# Patient Record
Sex: FEMALE | Race: WHITE | Employment: OTHER | URBAN - METROPOLITAN AREA
[De-identification: names, ages, dates, MRNs, and addresses within clinical notes are randomized per-mention and may not be internally consistent; named-entity substitution may affect disease eponyms.]

---

## 2017-01-03 ENCOUNTER — OFFICE VISIT (OUTPATIENT)
Dept: CARDIOLOGY CLINIC | Age: 82
End: 2017-01-03

## 2017-01-03 VITALS
HEIGHT: 65 IN | WEIGHT: 177.6 LBS | HEART RATE: 60 BPM | DIASTOLIC BLOOD PRESSURE: 80 MMHG | RESPIRATION RATE: 16 BRPM | BODY MASS INDEX: 29.59 KG/M2 | SYSTOLIC BLOOD PRESSURE: 120 MMHG

## 2017-01-03 DIAGNOSIS — R60.0 BILATERAL LOWER EXTREMITY EDEMA: ICD-10-CM

## 2017-01-03 DIAGNOSIS — I10 BENIGN ESSENTIAL HTN: Primary | ICD-10-CM

## 2017-01-03 DIAGNOSIS — E78.2 MIXED HYPERLIPIDEMIA: ICD-10-CM

## 2017-01-03 RX ORDER — DIPHENHYDRAMINE HCL 25 MG
25 CAPSULE ORAL
COMMUNITY

## 2017-01-03 RX ORDER — GUAIFENESIN 100 MG/5ML
200 SOLUTION ORAL
COMMUNITY
End: 2017-07-27 | Stop reason: ALTCHOICE

## 2017-01-03 RX ORDER — FUROSEMIDE 40 MG/1
TABLET ORAL DAILY
COMMUNITY

## 2017-01-03 NOTE — PROGRESS NOTES
[facility]: Doc Denis  ([phone]) [phone]    HPI: Ms. 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 Since her last visit, she has been doing okay. No chest pain or shortness of breath. No palpitations. She has occasional times when she may get some dizziness if she stands up quickly. She is seeing her eye specialist and noted to have macular degeneration. She is compensated on exam with clear lungs and 1+ chronic bilateral lower extremity edema, unchanged. Assessment and Plan:   1. Essential hypertension. Blood pressure is controlled and no changes made. She has a history of white coat hypertension. 2. Mixed hyperlipidemia. Tolerating statin. 3. Venous insufficiency. Elevate legs, compression stockings, avoid salt. She  has a past medical history of Arrhythmia; Cancer ([address] Descending thoracic aortic dissection ([address] Hypertension; Osteoarthritis; and Other ill-defined conditions(799.89). All other 12 pt systems negative except as above. PE  Vitals:    01/03/17 1408   BP: 120/80   Pulse: 60   Resp: 16   Weight: 177 lb 9.6 oz (80.6 kg)   Height: 5' 5\" (1.651 m)      Body mass index is 29.55 kg/(m^2).    General appearance - alert,and in no distress   Mental status - affect appropriate to mood   Neck - supple, no significant JVD   Chest - clear to auscultation, no wheezes, rales or rhonchi   Heart - normal rate, regular rhythm, normal S1, S2, 2/6 LSB murmurs, rubs, clicks or gallops   Abdomen - soft, nontender, nondistended   Extremities - peripheral pulses normal, 1+ pitting LE edema unchanged   Recent Labs:  Lab Results   Component Value Date/Time Cholesterol, total 171 01/22/2013 04:18 AM       Lab Results   Component Value Date/Time    Creatinine 0.88 01/22/2013 04:18 AM       Lab Results   Component Value Date/Time    BUN 21 01/22/2013 04:18 AM       Lab Results   Component Value Date/Time    Potassium 3.7 01/22/2013 04:18 AM       No results found for: HBA1C, [id number]    Lab Results   Component Value Date/Time    HGB 13.9 01/22/2013 04:18 AM       Lab Results   Component Value Date/Time    PLATELET 519 98/90/7050 04:18 AM         Reviewed:  Past Medical History   Diagnosis Date    Arrhythmia     Cancer ([facility] [address] [facility]      skin face / arms    Descending thoracic aortic dissection (HCC)      no surgical intervention    Hypertension     Osteoarthritis     Other ill-defined conditions(799.89)      macular degeneration. Left eye peripheral vision only, right eye vision impaired       History   Smoking Status    Never Smoker   Smokeless Tobacco    Never Used       History   Alcohol Use No       Allergies   Allergen Reactions    Celebrex [Celecoxib] Unknown (comments)    Darvocet A500 [Propoxyphene N-Acetaminophen] Unknown (comments)    Dilaudid [Hydromorphone (Bulk)] Unknown (comments)    Erythromycin Unknown (comments)    Lipitor [Atorvastatin] Unknown (comments)    Macrobid [Nitrofurantoin Monohyd/M-Cryst] Unknown (comments)    Penicillins Unknown (comments)    Sulfa (Sulfonamide Antibiotics) Unknown (comments)    Maxzide [Triamterene-Hydrochlorothiazid] Vertigo         Current Outpatient Prescriptions   Medication Sig    dextran 70-hypromellose (ARTIFICIAL TEARS,[id number],) ophthalmic solution Administer 1 Drop to both eyes four (4) times daily as needed.  diphenhydrAMINE (BENADRYL) 25 mg capsule Take 25 mg by mouth every six (6) hours as needed.  furosemide (LASIX) 40 mg tablet Take  by mouth daily.  guaiFENesin (ROBAFEN) 100 mg/5 mL liquid Take 200 mg by mouth three (3) times daily as needed for Cough.     pantoprazole (PROTONIX) 20 mg tablet TAKE ONE TABLET DAILY.  lisinopril (PRINIVIL, ZESTRIL) 40 mg tablet Take 1 Tab by mouth daily.  metoprolol tartrate (LOPRESSOR) 50 mg tablet TAKE ONE TABLET TWO TIMES A DAY    LANOLIN/MIN OIL/PETROLAT,WHT (ARTIFICIAL TEAR OINTMENT OP) Administer  to both eyes nightly.  pravastatin (PRAVACHOL) 20 mg tablet Take 2 Tabs by mouth nightly.  loperamide (IMODIUM) 2 mg capsule Take 2 mg by mouth every four (4) hours as needed for Diarrhea.  nitroglycerin (NITROSTAT) 0.4 mg SL tablet by SubLINGual route every five (5) minutes as needed.  acetaminophen (TYLENOL) 500 mg tablet Take  by mouth every six (6) hours as needed.  VIT C/DL-E AC/LUT/COPPER/ZNOX (PRESERVISION PO) Take  by mouth two (2) times a day.  aspirin 81 mg chewable tablet Take 81 mg by mouth daily. No current facility-administered medications for this visit.           Kathy Marinelli MD  [facility] [place]  [address] [facility]  [address]

## 2017-01-03 NOTE — MR AVS SNAPSHOT
Visit Information Date & Time Provider Department Dept. Phone Encounter #  
 1/3/2017  2:20 PM Indio Boyer MD [facility] [place] [phone] [id number] Your Appointments 7/5/2017  1:40 PM  
ESTABLISHED PATIENT with Indio Boyer MD  
[facility] ([place] [facility]) Appt Note: 6 mfu  
 [address] [place] [address]  
[address] [address] Upcoming Health Maintenance Date Due ZOSTER VACCINE AGE 60> 8/28/1979 GLAUCOMA SCREENING Q2Y 8/28/1984 OSTEOPOROSIS SCREENING (DEXA) 8/28/1984 Pneumococcal 65+ Low/Medium Risk (1 of 2 - PCV13) 8/28/1984 MEDICARE YEARLY EXAM 8/28/1984 DTaP/Tdap/Td series (1 - Tdap) 7/16/2012 INFLUENZA AGE 9 TO ADULT 8/1/2016 Allergies as of 1/3/2017  Review Complete On: 1/3/2017 By: Indio Boyer MD  
  
 Severity Noted Reaction Type Reactions Celebrex [Celecoxib]  06/27/2011   Not Verified Unknown (comments) Marvelyn Ser [Propoxyphene N-acetaminophen]  06/27/2011   Not Verified Unknown (comments) Dilaudid [Hydromorphone (Bulk)]  06/27/2011   Not Verified Unknown (comments) Erythromycin  06/27/2011   Not Verified Unknown (comments) Lipitor [Atorvastatin]  06/27/2011   Not Verified Unknown (comments) [address] [Nitrofurantoin Monohyd/m-cryst]  06/27/2011   Not Verified Unknown (comments) Penicillins  06/27/2011   Not Verified Unknown (comments) Sulfa (Sulfonamide Antibiotics)  06/27/2011   Not Verified Unknown (comments) Maxzide [Triamterene-hydrochlorothiazid] Low 02/06/2013   Side Effect Vertigo Current Immunizations  Reviewed on 1/21/2013 Name Date  
 TD Vaccine 7/15/2012  1:23 PM  
  
 Not reviewed this visit Vitals  BP Pulse Resp Height(growth percentile) Weight(growth percentile) BMI  
 120/80 (BP 1 Location: Left arm, BP Patient Position: Sitting) 60 16 5' 5\" (1.651 m) 177 lb 9.6 oz (80.6 kg) 29.55 kg/m2 OB Status Smoking Status Postmenopausal Never Smoker Vitals History BMI and BSA Data Body Mass Index Body Surface Area  
 29.55 kg/m 2 1.92 m 2 Preferred Pharmacy Pharmacy Name Phone [facility]  [phone] Your Updated Medication List  
  
   
This list is accurate as of: 1/3/17  2:59 PM.  Always use your most recent med list.  
  
  
  
  
 acetaminophen 500 mg tablet Commonly known as:  TYLENOL Take  by mouth every six (6) hours as needed. ARTIFICIAL TEAR OINTMENT OP Administer  to both eyes nightly. ARTIFICIAL TEARS([id number]) ophthalmic solution Generic drug:  dextran 70-hypromellose Administer 1 Drop to both eyes four (4) times daily as needed. aspirin 81 mg chewable tablet Take 81 mg by mouth daily. BENADRYL 25 mg capsule Generic drug:  diphenhydrAMINE Take 25 mg by mouth every six (6) hours as needed. furosemide 40 mg tablet Commonly known as:  LASIX Take  by mouth daily. lisinopril 40 mg tablet Commonly known as:  Laly Tracee Take 1 Tab by mouth daily. loperamide 2 mg capsule Commonly known as:  IMODIUM Take 2 mg by mouth every four (4) hours as needed for Diarrhea.  
  
 metoprolol tartrate 50 mg tablet Commonly known as:  LOPRESSOR  
TAKE ONE TABLET TWO TIMES A DAY  
  
 nitroglycerin 0.4 mg SL tablet Commonly known as:  NITROSTAT  
by SubLINGual route every five (5) minutes as needed. pantoprazole 20 mg tablet Commonly known as:  PROTONIX  
TAKE ONE TABLET DAILY. pravastatin 20 mg tablet Commonly known as:  PRAVACHOL Take 2 Tabs by mouth nightly. PRESERVISION PO Take  by mouth two (2) times a day. ROBAFEN 100 mg/5 mL liquid Generic drug:  guaiFENesin Take 200 mg by mouth three (3) times daily as needed for Cough. Introducing [facility]! Robbie Juanjose introduces [employer] patient portal. Now you can access parts of your medical record, email your doctor's office, and request medication refills online. 1. In your internet browser, go to [email or web address] 2. Click on the First Time User? Click Here link in the Sign In box. You will see the New Member Sign Up page. 3. Enter your [employer] Access Code exactly as it appears below. You will not need to use this code after youve completed the sign-up process. If you do not sign up before the expiration date, you must request a new code. · [employer] Access Code: [email or web address] Expires: 1/6/2017 11:13 AM 
 
4. Enter the last four digits of your Social Security Number (xxxx) and Date of Birth (mm/dd/yyyy) as indicated and click Submit. You will be taken to the next sign-up page. 5. Create a [employer] ID. This will be your [employer] login ID and cannot be changed, so think of one that is secure and easy to remember. 6. Create a [employer] password. You can change your password at any time. 7. Enter your Password Reset Question and Answer. This can be used at a later time if you forget your password. 8. Enter your e-mail address. You will receive e-mail notification when new information is available in [address] 9. Click Sign Up. You can now view and download portions of your medical record. 10. Click the Download Summary menu link to download a portable copy of your medical information. If you have questions, please visit the Frequently Asked Questions section of the [employer] website. Remember, [employer] is NOT to be used for urgent needs. For medical emergencies, dial 911. Now available from your iPhone and Android! Please provide this summary of care documentation to your next provider. Your primary care clinician is listed as Jagdish Rojas. If you have any questions after today's visit, please call [phone].

## 2017-01-19 RX ORDER — METOPROLOL TARTRATE 50 MG/1
TABLET ORAL
Qty: 180 TAB | Refills: 3 | Status: SHIPPED | OUTPATIENT
Start: 2017-01-19 | End: 2017-06-01

## 2017-04-13 RX ORDER — PRAVASTATIN SODIUM 20 MG/1
40 TABLET ORAL
Qty: 180 TAB | Refills: 0 | Status: SHIPPED | OUTPATIENT
Start: 2017-04-13

## 2017-04-14 RX ORDER — LISINOPRIL 40 MG/1
40 TABLET ORAL DAILY
Qty: 30 TAB | Refills: 5 | Status: SHIPPED | OUTPATIENT
Start: 2017-04-14 | End: 2017-10-25 | Stop reason: SDUPTHER

## 2017-04-14 NOTE — TELEPHONE ENCOUNTER
Requested Prescriptions     Pending Prescriptions Disp Refills    lisinopril (PRINIVIL, ZESTRIL) 40 mg tablet 30 Tab 5     Sig: Take 1 Tab by mouth daily.

## 2017-05-09 RX ORDER — ASPIRIN 81 MG/1
TABLET ORAL
Qty: 90 TAB | Status: SHIPPED | OUTPATIENT
Start: 2017-05-09

## 2017-06-01 ENCOUNTER — TELEPHONE (OUTPATIENT)
Dept: CARDIOLOGY CLINIC | Age: 82
End: 2017-06-01

## 2017-06-01 RX ORDER — METOPROLOL TARTRATE 25 MG/1
TABLET, FILM COATED ORAL 2 TIMES DAILY
COMMUNITY

## 2017-06-01 NOTE — TELEPHONE ENCOUNTER
Tiana from assisted living calling regarding patient's blood pressures. She has a wide range but sometimes will drop as low as 84/54, and as high as 167/68.   She takes the following:  Metoprolol 25 mg BID  Lasix 40 daily  Lisinopril 40 mg   She is requesting parameters when to hold medication and fax to [facility] @   Fax  [phone], please advise  2 pt identifiers used      Per Dr. Patrice Cardona I faxed an order to [facility] at the above fax #  Hold if SBP <100.  thx

## 2017-06-01 NOTE — TELEPHONE ENCOUNTER
Tika Landeros calling from the [facility] in regards to the patient's low bp readings. Requests a call back at [phone]. Thanks!

## 2017-07-21 DIAGNOSIS — R60.1 GENERALIZED EDEMA: ICD-10-CM

## 2017-07-21 DIAGNOSIS — I71.012 DESCENDING THORACIC AORTIC DISSECTION: ICD-10-CM

## 2017-07-21 DIAGNOSIS — I10 ESSENTIAL HYPERTENSION: ICD-10-CM

## 2017-07-27 ENCOUNTER — OFFICE VISIT (OUTPATIENT)
Dept: CARDIOLOGY CLINIC | Age: 82
End: 2017-07-27

## 2017-07-27 VITALS
HEIGHT: 65 IN | HEART RATE: 54 BPM | DIASTOLIC BLOOD PRESSURE: 70 MMHG | WEIGHT: 160.2 LBS | RESPIRATION RATE: 16 BRPM | BODY MASS INDEX: 26.69 KG/M2 | SYSTOLIC BLOOD PRESSURE: 120 MMHG

## 2017-07-27 DIAGNOSIS — R60.0 BILATERAL LOWER EXTREMITY EDEMA: ICD-10-CM

## 2017-07-27 DIAGNOSIS — E78.2 MIXED HYPERLIPIDEMIA: ICD-10-CM

## 2017-07-27 DIAGNOSIS — I10 ESSENTIAL HYPERTENSION: Primary | ICD-10-CM

## 2017-07-27 NOTE — MR AVS SNAPSHOT
Visit Information Date & Time Provider Department Dept. Phone Encounter #  
 2017 11:20 AM Surya Evans MD [facility] [place] [phone] [id number] Your Appointments 2018  9:00 AM  
ESTABLISHED PATIENT with Surya Evans MD  
[facility] ([address]) Appt Note: 6 month follow up  
 [address]  
[address] [address] Upcoming Health Maintenance Date Due ZOSTER VACCINE AGE 60> 1979 GLAUCOMA SCREENING Q2Y 1984 OSTEOPOROSIS SCREENING (DEXA) 1984 Pneumococcal 65+ Low/Medium Risk (1 of 2 - PCV13) 1984 MEDICARE YEARLY EXAM 1984 DTaP/Tdap/Td series (1 - Tdap) 2012 INFLUENZA AGE 9 TO ADULT 2017 Allergies as of 2017  Review Complete On: 2017 By: Brina Cabral Severity Noted Reaction Type Reactions Celebrex [Celecoxib]  2011   Not Verified Unknown (comments) Andrew Dilcia [Propoxyphene N-acetaminophen]  2011   Not Verified Unknown (comments) Dilaudid [Hydromorphone (Bulk)]  2011   Not Verified Unknown (comments) Erythromycin  2011   Not Verified Unknown (comments) Lipitor [Atorvastatin]  2011   Not Verified Unknown (comments) Gemma Cobia [Nitrofurantoin Monohyd/m-cryst]  2011   Not Verified Unknown (comments) Penicillins  2011   Not Verified Unknown (comments) Sulfa (Sulfonamide Antibiotics)  2011   Not Verified Unknown (comments) Maxzide [Triamterene-hydrochlorothiazid] Low 2013   Side Effect Vertigo Current Immunizations  Reviewed on 2013 Name Date  
 TD Vaccine 7/15/2012  1:23 PM  
  
 Not reviewed this visit You Were Diagnosed With   
  
 Codes Comments Edema, unspecified type    -  Primary ICD-10-CM: R60.9 ICD-9-CM: 782.3 Essential hypertension     ICD-10-CM: I10 
ICD-9-CM: 401.9 Descending thoracic aortic dissection (HCC)     ICD-10-CM: I71.01 
ICD-9-CM: 441.01 Vitals BP Pulse Resp Height(growth percentile) Weight(growth percentile) BMI  
 120/70 (BP 1 Location: Left arm, BP Patient Position: Sitting) (!) 54 16 5' 5\" (1.651 m) 160 lb 3.2 oz (72.7 kg) 26.66 kg/m2 OB Status Smoking Status Postmenopausal Never Smoker Vitals History BMI and BSA Data Body Mass Index Body Surface Area  
 26.66 kg/m 2 1.83 m 2 Preferred Pharmacy Pharmacy Name Phone [facility]  [phone] Your Updated Medication List  
  
   
This list is accurate as of: 7/27/17 11:59 AM.  Always use your most recent med list.  
  
  
  
  
 acetaminophen 500 mg tablet Commonly known as:  TYLENOL Take  by mouth every six (6) hours as needed. ARTIFICIAL TEAR OINTMENT OP Administer  to both eyes nightly. ARTIFICIAL TEARS([id number]) ophthalmic solution Generic drug:  dextran 70-hypromellose Administer 1 Drop to both eyes four (4) times daily as needed. aspirin delayed-release 81 mg tablet TAKE ONE TABLET EVERY DAY  
  
 BENADRYL 25 mg capsule Generic drug:  diphenhydrAMINE Take 25 mg by mouth every six (6) hours as needed. furosemide 40 mg tablet Commonly known as:  LASIX Take  by mouth daily. lisinopril 40 mg tablet Commonly known as:  Lollie Jump Take 1 Tab by mouth daily. loperamide 2 mg capsule Commonly known as:  IMODIUM Take 2 mg by mouth every four (4) hours as needed for Diarrhea.  
  
 metoprolol tartrate 25 mg tablet Commonly known as:  LOPRESSOR Take  by mouth two (2) times a day. nitroglycerin 0.4 mg SL tablet Commonly known as:  NITROSTAT  
by SubLINGual route every five (5) minutes as needed. pantoprazole 20 mg tablet Commonly known as:  PROTONIX TAKE ONE TABLET DAILY. pravastatin 20 mg tablet Commonly known as:  PRAVACHOL Take 2 Tabs by mouth nightly. PRESERVISION PO Take  by mouth two (2) times a day. ROBAFEN 100 mg/5 mL liquid Generic drug:  guaiFENesin Take 200 mg by mouth three (3) times daily as needed for Cough. We Performed the Following AMB POC EKG ROUTINE W/ 12 LEADS, INTER & REP [92777 CPT(R)] Introducing [facility]! Chanda Kenny introduces [employer] patient portal. Now you can access parts of your medical record, email your doctor's office, and request medication refills online. 1. In your internet browser, go to [email or web address] 2. Click on the First Time User? Click Here link in the Sign In box. You will see the New Member Sign Up page. 3. Enter your [employer] Access Code exactly as it appears below. You will not need to use this code after youve completed the sign-up process. If you do not sign up before the expiration date, you must request a new code. · [employer] Access Code: [email or web address] Expires: 10/25/2017  8:16 AM 
 
4. Enter the last four digits of your Social Security Number (xxxx) and Date of Birth (mm/dd/yyyy) as indicated and click Submit. You will be taken to the next sign-up page. 5. Create a [employer] ID. This will be your [employer] login ID and cannot be changed, so think of one that is secure and easy to remember. 6. Create a [employer] password. You can change your password at any time. 7. Enter your Password Reset Question and Answer. This can be used at a later time if you forget your password. 8. Enter your e-mail address. You will receive e-mail notification when new information is available in [address] 9. Click Sign Up. You can now view and download portions of your medical record. 10. Click the Download Summary menu link to download a portable copy of your medical information. If you have questions, please visit the Frequently Asked Questions section of the [employer] website. Remember, [employer] is NOT to be used for urgent needs. For medical emergencies, dial 911. Now available from your iPhone and Android! Please provide this summary of care documentation to your next provider. Your primary care clinician is listed as Jagdish Rojsa. If you have any questions after today's visit, please call [phone].

## 2017-07-27 NOTE — PROGRESS NOTES
[facility]: Gaby Hobbs  ([id number]) [phone]    HPI: Ms. 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 Since her last visit, she has been doing well. No exertional chest pain. Her breathing has been stable. No significant palpitations. A few months ago, they did note she had some labile blood pressure, but she says this has been okay. Blood pressure here is 120/70 with a heart rate of 54 bpm.  Her EKG was sinus bradycardia with nonspecific ST-T wave abnormality. She is compensated on exam with clear lungs, chronic trace to 1+ bilateral lower extremity edema, unchanged. Assessment and Plan:   1. Essential hypertension. Blood pressure is controlled and no changes made. History of white coat hypertension. 2. Mixed hyperlipidemia. Tolerating statin. 3. Venous insufficiency. Elevate legs, compression stockings and avoid salt. She  has a past medical history of Arrhythmia; Cancer ([address] Descending thoracic aortic dissection ([address] Hypertension; Osteoarthritis; and Other ill-defined conditions. All other 12 pt systems negative except as above. PE  Vitals:    07/27/17 1131   BP: 120/70   Pulse: (!) 54   Resp: 16   Weight: 160 lb 3.2 oz (72.7 kg)   Height: 5' 5\" (1.651 m)      Body mass index is 26.66 kg/(m^2).    General appearance - alert,and in no distress   Mental status - affect appropriate to mood   Neck - supple, no significant JVD   Chest - clear to auscultation, no wheezes, rales or rhonchi   Heart - normal rate, regular rhythm, normal S1, S2, II/VI LSB murmurs, rubs, clicks or gallops   Abdomen - soft, nontender, nondistended   Extremities - peripheral pulses normal, 1+ pitting LE edema unchanged   Recent Labs:  Lab Results   Component Value Date/Time    Cholesterol, total 171 01/22/2013 04:18 AM       Lab Results   Component Value Date/Time    Creatinine 0.88 01/22/2013 04:18 AM       Lab Results   Component Value Date/Time    BUN 21 01/22/2013 04:18 AM       Lab Results   Component Value Date/Time    Potassium 3.7 01/22/2013 04:18 AM       No results found for: HBA1C, [id number]    Lab Results   Component Value Date/Time    HGB 13.9 01/22/2013 04:18 AM       Lab Results   Component Value Date/Time    PLATELET 104 50/19/7949 04:18 AM         Reviewed:  Past Medical History:   Diagnosis Date    Arrhythmia     Cancer ([facility]     skin face / arms    Descending thoracic aortic dissection (HCC)     no surgical intervention    Hypertension     Osteoarthritis     Other ill-defined conditions     macular degeneration. Left eye peripheral vision only, right eye vision impaired       History   Smoking Status    Never Smoker   Smokeless Tobacco    Never Used       History   Alcohol Use No       Allergies   Allergen Reactions    Celebrex [Celecoxib] Unknown (comments)    Darvocet A500 [Propoxyphene N-Acetaminophen] Unknown (comments)    Dilaudid [Hydromorphone (Bulk)] Unknown (comments)    Erythromycin Unknown (comments)    Lipitor [Atorvastatin] Unknown (comments)    Macrobid [Nitrofurantoin Monohyd/M-Cryst] Unknown (comments)    Penicillins Unknown (comments)    Sulfa (Sulfonamide Antibiotics) Unknown (comments)    Maxzide [Triamterene-Hydrochlorothiazid] Vertigo         Current Outpatient Prescriptions   Medication Sig    metoprolol tartrate (LOPRESSOR) 25 mg tablet Take  by mouth two (2) times a day.  aspirin delayed-release 81 mg tablet TAKE ONE TABLET EVERY DAY    lisinopril (PRINIVIL, ZESTRIL) 40 mg tablet Take 1 Tab by mouth daily.  pravastatin (PRAVACHOL) 20 mg tablet Take 2 Tabs by mouth nightly.     dextran 70-hypromellose (ARTIFICIAL TEARS,[id number],) ophthalmic solution Administer 1 Drop to both eyes four (4) times daily as needed.  diphenhydrAMINE (BENADRYL) 25 mg capsule Take 25 mg by mouth every six (6) hours as needed.  furosemide (LASIX) 40 mg tablet Take  by mouth daily.  guaiFENesin (ROBAFEN) 100 mg/5 mL liquid Take 200 mg by mouth three (3) times daily as needed for Cough.  pantoprazole (PROTONIX) 20 mg tablet TAKE ONE TABLET DAILY.  LANOLIN/MIN OIL/PETROLAT,WHT (ARTIFICIAL TEAR OINTMENT OP) Administer  to both eyes nightly.  loperamide (IMODIUM) 2 mg capsule Take 2 mg by mouth every four (4) hours as needed for Diarrhea.  nitroglycerin (NITROSTAT) 0.4 mg SL tablet by SubLINGual route every five (5) minutes as needed.  acetaminophen (TYLENOL) 500 mg tablet Take  by mouth every six (6) hours as needed.  VIT C/DL-E AC/LUT/COPPER/ZNOX (PRESERVISION PO) Take  by mouth two (2) times a day.  aspirin 81 mg chewable tablet Take 81 mg by mouth daily. No current facility-administered medications for this visit.           Angela Gregorio MD  [facility] and Vascular [place]  [address] [facility]  [address]

## 2017-08-28 RX ORDER — PANTOPRAZOLE SODIUM 20 MG/1
TABLET, DELAYED RELEASE ORAL
Qty: 90 TAB | Refills: 2 | Status: SHIPPED | OUTPATIENT
Start: 2017-08-28 | End: 2018-07-23 | Stop reason: SDUPTHER

## 2017-08-28 NOTE — TELEPHONE ENCOUNTER
Requested Prescriptions     Signed Prescriptions Disp Refills    pantoprazole (PROTONIX) 20 mg tablet 90 Tab 2     Sig: TAKE ONE TABLET DAILY.      Authorizing Provider: Vaishali Laws     Ordering User: Marnie Echeverria     Per orders

## 2017-10-26 RX ORDER — LISINOPRIL 40 MG/1
40 TABLET ORAL DAILY
Qty: 30 TAB | Refills: 5 | Status: SHIPPED | OUTPATIENT
Start: 2017-10-26

## 2018-01-29 ENCOUNTER — OFFICE VISIT (OUTPATIENT)
Dept: CARDIOLOGY CLINIC | Age: 83
End: 2018-01-29

## 2018-01-29 VITALS
BODY MASS INDEX: 25.49 KG/M2 | DIASTOLIC BLOOD PRESSURE: 80 MMHG | HEART RATE: 62 BPM | HEIGHT: 65 IN | WEIGHT: 153 LBS | RESPIRATION RATE: 16 BRPM | SYSTOLIC BLOOD PRESSURE: 130 MMHG

## 2018-01-29 DIAGNOSIS — R60.0 BILATERAL LOWER EXTREMITY EDEMA: ICD-10-CM

## 2018-01-29 DIAGNOSIS — E78.2 MIXED HYPERLIPIDEMIA: ICD-10-CM

## 2018-01-29 DIAGNOSIS — I10 BENIGN ESSENTIAL HTN: Primary | ICD-10-CM

## 2018-01-29 RX ORDER — METOPROLOL TARTRATE 50 MG/1
25 TABLET ORAL 2 TIMES DAILY
COMMUNITY

## 2018-01-29 RX ORDER — LISINOPRIL 20 MG/1
TABLET ORAL DAILY
COMMUNITY

## 2018-01-29 RX ORDER — LACTULOSE 10 G/15ML
SOLUTION ORAL; RECTAL 3 TIMES DAILY
COMMUNITY

## 2018-01-29 NOTE — PROGRESS NOTES
[place]: Nupur Vines  [phone]) [phone]    HPI: Ms. 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 Since her last visit, she continues to do well. No exertional chest pain. Her breathing has been stable. No significant palpitations. She is compensated on exam with clear lungs and chronic trace to 1+ bilateral lower extremity edema, unchanged. Blood pressure is 130/80 with a heart rate of 62 bpm.  Her weight is down a little bit from 160 to 153 lbs. Assessment and Plan:   1. Essential hypertension. Blood pressure is controlled and no changes made. History of white coat hypertension. 2. Mixed hyperlipidemia. Tolerating statin. 3. Venous insufficiency. Elevate legs, compression stockings and avoid salt. She  has a past medical history of Arrhythmia; Cancer ([address] Descending thoracic aortic dissection ([address] Hypertension; Osteoarthritis; and Other ill-defined conditions(799.89). All other 12 pt systems negative except as above. PE  Vitals:    01/29/18 0855   BP: 130/80   Pulse: 62   Resp: 16   Weight: 153 lb (69.4 kg)   Height: 5' 5\" (1.651 m)      Body mass index is 25.46 kg/(m^2).    General appearance - alert,and in no distress   Mental status - affect appropriate to mood   Neck - supple, no significant JVD   Chest - clear to auscultation, no wheezes, rales or rhonchi   Heart - normal rate, regular rhythm, normal S1, S2, II/VI LSB murmurs, rubs, clicks or gallops   Abdomen - soft, nontender, nondistended   Extremities - peripheral pulses normal, 1+ pitting LE edema unchanged   Recent Labs:  Lab Results   Component Value Date/Time    Cholesterol, total 171 01/22/2013 04:18 AM       Lab Results   Component Value Date/Time    Creatinine 0.88 01/22/2013 04:18 AM       Lab Results   Component Value Date/Time    BUN 21 01/22/2013 04:18 AM       Lab Results   Component Value Date/Time    Potassium 3.7 01/22/2013 04:18 AM       No results found for: HBA1C, RPE7QNQC    Lab Results   Component Value Date/Time    HGB 13.9 01/22/2013 04:18 AM       Lab Results   Component Value Date/Time    PLATELET 054 33/41/9712 04:18 AM         Reviewed:  Past Medical History:   Diagnosis Date    Arrhythmia     Cancer ([facility] [address]     skin face / arms    Descending thoracic aortic dissection (HCC)     no surgical intervention    Hypertension     Osteoarthritis     Other ill-defined conditions(799.89)     macular degeneration. Left eye peripheral vision only, right eye vision impaired       History   Smoking Status    Never Smoker   Smokeless Tobacco    Never Used       History   Alcohol Use No       Allergies   Allergen Reactions    Celebrex [Celecoxib] Unknown (comments)    Darvocet A500 [Propoxyphene N-Acetaminophen] Unknown (comments)    Dilaudid [Hydromorphone (Bulk)] Unknown (comments)    Erythromycin Unknown (comments)    Lipitor [Atorvastatin] Unknown (comments)    Macrobid [Nitrofurantoin Monohyd/M-Cryst] Unknown (comments)    Penicillins Unknown (comments)    Sulfa (Sulfonamide Antibiotics) Unknown (comments)    Maxzide [Triamterene-Hydrochlorothiazid] Vertigo         Current Outpatient Prescriptions   Medication Sig    lactulose (CHRONULAC) 10 gram/15 mL solution Take  by mouth three (3) times daily.  MINERAL OIL/PETROLATUM,WHITE (GENTEAL PM OP) Apply  to eye nightly.  MULTIVIT-MIN/FA/LUTEIN/ZEAXANT (ICAPS MV PO) Take 1 Tab by mouth daily.  lisinopril (PRINIVIL, ZESTRIL) 20 mg tablet Take  by mouth daily.  metoprolol tartrate (LOPRESSOR) 50 mg tablet Take 25 mg by mouth two (2) times a day.  pantoprazole (PROTONIX) 20 mg tablet TAKE ONE TABLET DAILY.     peg 400-propylene glycol (SYSTANE, PROPYLENE GLYCOL,) 0.4-0.3 % drop Administer  to left eye as needed.  aspirin delayed-release 81 mg tablet TAKE ONE TABLET EVERY DAY    furosemide (LASIX) 40 mg tablet Take  by mouth daily.  nitroglycerin (NITROSTAT) 0.4 mg SL tablet by SubLINGual route every five (5) minutes as needed.  acetaminophen (TYLENOL) 500 mg tablet Take  by mouth every six (6) hours as needed.  lisinopril (PRINIVIL, ZESTRIL) 40 mg tablet Take 1 Tab by mouth daily.  metoprolol tartrate (LOPRESSOR) 25 mg tablet Take  by mouth two (2) times a day.  pravastatin (PRAVACHOL) 20 mg tablet Take 2 Tabs by mouth nightly.  dextran 70-hypromellose (ARTIFICIAL TEARS,[id number],) ophthalmic solution Administer 1 Drop to both eyes four (4) times daily as needed.  diphenhydrAMINE (BENADRYL) 25 mg capsule Take 25 mg by mouth every six (6) hours as needed.  LANOLIN/MIN OIL/PETROLAT,WHT (ARTIFICIAL TEAR OINTMENT OP) Administer  to both eyes nightly.  loperamide (IMODIUM) 2 mg capsule Take 2 mg by mouth every four (4) hours as needed for Diarrhea.  VIT C/DL-E AC/LUT/COPPER/ZNOX (PRESERVISION PO) Take  by mouth two (2) times a day. No current facility-administered medications for this visit.           MD Akhil Roque [facility] [place]  [address] [facility] [address]  [address]

## 2018-01-29 NOTE — MR AVS SNAPSHOT
[address] [facility] [address] [facility] 
[phone] Patient: Karin Davis MRN: [id number] HSF:[id number] Visit Information Date & Time Provider Department Dept. Phone Encounter #  
 1/29/2018  9:00 AM Juan M Vasquez MD [facility] Michelle Palencia [phone] [id number] Upcoming Health Maintenance Date Due ZOSTER VACCINE AGE 60> 6/28/1979 GLAUCOMA SCREENING Q2Y 8/28/1984 OSTEOPOROSIS SCREENING (DEXA) 8/28/1984 Pneumococcal 65+ Low/Medium Risk (1 of 2 - PCV13) 8/28/1984 MEDICARE YEARLY EXAM 8/28/1984 DTaP/Tdap/Td series (1 - Tdap) 7/16/2012 Influenza Age 5 to Adult 8/1/2017 Allergies as of 1/29/2018  Review Complete On: 1/29/2018 By: Brittany Dominguez Severity Noted Reaction Type Reactions Celebrex [Celecoxib]  06/27/2011   Not Verified Unknown (comments) Verba Block [Propoxyphene N-acetaminophen]  06/27/2011   Not Verified Unknown (comments) Dilaudid [Hydromorphone (Bulk)]  06/27/2011   Not Verified Unknown (comments) Erythromycin  06/27/2011   Not Verified Unknown (comments) Lipitor [Atorvastatin]  06/27/2011   Not Verified Unknown (comments) Karin Merritt [Nitrofurantoin Monohyd/m-cryst]  06/27/2011   Not Verified Unknown (comments) Penicillins  06/27/2011   Not Verified Unknown (comments) Sulfa (Sulfonamide Antibiotics)  06/27/2011   Not Verified Unknown (comments) Maxzide [Triamterene-hydrochlorothiazid] Low 02/06/2013   Side Effect Vertigo Current Immunizations  Reviewed on 1/21/2013 Name Date  
 TD Vaccine 7/15/2012  1:23 PM  
  
 Not reviewed this visit Vitals BP Pulse Resp Height(growth percentile) Weight(growth percentile) BMI  
 130/80 (BP 1 Location: Right arm, BP Patient Position: Sitting) 62 16 5' 5\" (1.651 m) 153 lb (69.4 kg) 25.46 kg/m2 OB Status Smoking Status Postmenopausal Never Smoker Vitals History BMI and BSA Data Body Mass Index Body Surface Area  
 25.46 kg/m 2 1.78 m 2 Preferred Pharmacy Pharmacy Name Phone [address] [facility] [phone] Your Updated Medication List  
  
   
This list is accurate as of: 1/29/18  9:18 AM.  Always use your most recent med list.  
  
  
  
  
 acetaminophen 500 mg tablet Commonly known as:  TYLENOL Take  by mouth every six (6) hours as needed. ARTIFICIAL TEAR OINTMENT OP Administer  to both eyes nightly. ARTIFICIAL TEARS([id number]) ophthalmic solution Generic drug:  dextran 70-hypromellose Administer 1 Drop to both eyes four (4) times daily as needed. aspirin delayed-release 81 mg tablet TAKE ONE TABLET EVERY DAY  
  
 BENADRYL 25 mg capsule Generic drug:  diphenhydrAMINE Take 25 mg by mouth every six (6) hours as needed. furosemide 40 mg tablet Commonly known as:  LASIX Take  by mouth daily. GENTEAL PM OP Apply  to eye nightly. ICAPS MV PO Take 1 Tab by mouth daily. lactulose 10 gram/15 mL solution Commonly known as:  [place]ameron Mullen Take  by mouth three (3) times daily. * lisinopril 20 mg tablet Commonly known as:  [place] Take  by mouth daily. * lisinopril 40 mg tablet Commonly known as:  [place] Take 1 Tab by mouth daily. loperamide 2 mg capsule Commonly known as:  IMODIUM Take 2 mg by mouth every four (4) hours as needed for Diarrhea. * metoprolol tartrate 25 mg tablet Commonly known as:  LOPRESSOR Take  by mouth two (2) times a day. * metoprolol tartrate 50 mg tablet Commonly known as:  LOPRESSOR Take 25 mg by mouth two (2) times a day. nitroglycerin 0.4 mg SL tablet Commonly known as:  NITROSTAT  
by SubLINGual route every five (5) minutes as needed. pantoprazole 20 mg tablet Commonly known as:  PROTONIX  
TAKE ONE TABLET DAILY. pravastatin 20 mg tablet Commonly known as:  PRAVACHOL Take 2 Tabs by mouth nightly. PRESERVISION PO Take  by mouth two (2) times a day. SYSTANE (PROPYLENE GLYCOL) 0.4-0.3 % Drop Generic drug:  peg 400-propylene glycol Administer  to left eye as needed. * Notice: This list has 4 medication(s) that are the same as other medications prescribed for you. Read the directions carefully, and ask your doctor or other care provider to review them with you. Introducing [facility]! Anders Roberson introduces [employer] patient portal. Now you can access parts of your medical record, email your doctor's office, and request medication refills online. 1. In your internet browser, go to [email or web address] 2. Click on the First Time User? Click Here link in the Sign In box. You will see the New Member Sign Up page. 3. Enter your [employer] Access Code exactly as it appears below. You will not need to use this code after youve completed the sign-up process. If you do not sign up before the expiration date, you must request a new code. · [employer] Access Code: [email or web address] Expires: 4/29/2018  9:18 AM 
 
4. Enter the last four digits of your Social Security Number (xxxx) and Date of Birth (mm/dd/yyyy) as indicated and click Submit. You will be taken to the next sign-up page. 5. Create a [employer] ID. This will be your [employer] login ID and cannot be changed, so think of one that is secure and easy to remember. 6. Create a [employer] password. You can change your password at any time. 7. Enter your Password Reset Question and Answer. This can be used at a later time if you forget your password. 8. Enter your e-mail address. You will receive e-mail notification when new information is available in [address] 9. Click Sign Up. You can now view and download portions of your medical record.  
10. Click the Download Summary menu link to download a portable copy of your medical information. If you have questions, please visit the Frequently Asked Questions section of the [employer] website. Remember, [employer] is NOT to be used for urgent needs. For medical emergencies, dial 911. Now available from your iPhone and Android! Please provide this summary of care documentation to your next provider. Your primary care clinician is listed as Jagdish Rojas. If you have any questions after today's visit, please call [phone].

## 2018-02-28 RX ORDER — METOPROLOL TARTRATE 50 MG/1
TABLET ORAL
Qty: 180 TAB | Refills: 0 | Status: SHIPPED | OUTPATIENT
Start: 2018-02-28

## 2018-07-23 RX ORDER — PANTOPRAZOLE SODIUM 20 MG/1
TABLET, DELAYED RELEASE ORAL
Qty: 90 TAB | Refills: 0 | Status: SHIPPED | OUTPATIENT
Start: 2018-07-23